# Patient Record
Sex: FEMALE | Race: WHITE | NOT HISPANIC OR LATINO | Employment: FULL TIME | URBAN - METROPOLITAN AREA
[De-identification: names, ages, dates, MRNs, and addresses within clinical notes are randomized per-mention and may not be internally consistent; named-entity substitution may affect disease eponyms.]

---

## 2018-03-23 ENCOUNTER — TELEPHONE (OUTPATIENT)
Dept: NEUROLOGY | Facility: CLINIC | Age: 53
End: 2018-03-23

## 2019-03-23 ENCOUNTER — OFFICE VISIT (OUTPATIENT)
Dept: URGENT CARE | Facility: URGENT CARE | Age: 54
End: 2019-03-23
Payer: COMMERCIAL

## 2019-03-23 VITALS
TEMPERATURE: 97.4 F | HEART RATE: 91 BPM | DIASTOLIC BLOOD PRESSURE: 85 MMHG | RESPIRATION RATE: 18 BRPM | SYSTOLIC BLOOD PRESSURE: 119 MMHG | WEIGHT: 143 LBS | HEIGHT: 64 IN | OXYGEN SATURATION: 99 % | BODY MASS INDEX: 24.41 KG/M2

## 2019-03-23 DIAGNOSIS — B96.89 ACUTE BACTERIAL SINUSITIS: Primary | ICD-10-CM

## 2019-03-23 DIAGNOSIS — J01.90 ACUTE BACTERIAL SINUSITIS: Primary | ICD-10-CM

## 2019-03-23 PROCEDURE — 99202 OFFICE O/P NEW SF 15 MIN: CPT | Performed by: NURSE PRACTITIONER

## 2019-03-23 RX ORDER — AMOXICILLIN AND CLAVULANATE POTASSIUM 875; 125 MG/1; MG/1
1 TABLET, FILM COATED ORAL 2 TIMES DAILY
Qty: 20 TABLET | Refills: 0 | Status: SHIPPED | OUTPATIENT
Start: 2019-03-23 | End: 2019-08-14 | Stop reason: ALTCHOICE

## 2019-03-23 ASSESSMENT — ENCOUNTER SYMPTOMS
DIZZINESS: 0
PALPITATIONS: 0
FACIAL ASYMMETRY: 0
NUMBNESS: 0
LIGHT-HEADEDNESS: 0
HEADACHES: 1
NAUSEA: 0
VOMITING: 0
SPEECH DIFFICULTY: 0
FEVER: 1
MYALGIAS: 0
WHEEZING: 0
ABDOMINAL PAIN: 0
SHORTNESS OF BREATH: 0
STRIDOR: 0
SINUS PAIN: 1
RHINORRHEA: 1
ARTHRALGIAS: 0
SINUS PRESSURE: 1
CHILLS: 1
WEAKNESS: 0
COUGH: 1
SORE THROAT: 0

## 2019-03-23 ASSESSMENT — PAIN SCALES - GENERAL: PAINLEVEL: 4

## 2019-03-23 NOTE — PROGRESS NOTES
"Subjective      HPI    Erika Davalos is a 53 y.o. female who presents for 3-week history of rhinorrhea, sinus pain, tenderness.  Over the last couple of days she has noticed increasing sinus pain pressure and tenderness.  In addition, has also been experiencing sinus headache and upper dental discomfort.  She does report some fullness to her ears, no ear pain.  Denies sore throat.  Does report some postnasal drip.  Denies any chest congestion, tightness, wheezing.  She does report some intermittent low-grade fever and chills.  She does report past history of sinus surgery but this has been a few years ago.      Review of Systems   Constitutional: Positive for chills and fever.   HENT: Positive for ear pain (fullness), postnasal drip, rhinorrhea, sinus pressure and sinus pain. Negative for sore throat.    Respiratory: Positive for cough. Negative for shortness of breath, wheezing and stridor.    Cardiovascular: Negative for chest pain and palpitations.   Gastrointestinal: Negative for abdominal pain, nausea and vomiting.   Musculoskeletal: Negative for arthralgias and myalgias.   Neurological: Positive for headaches. Negative for dizziness, syncope, facial asymmetry, speech difficulty, weakness, light-headedness and numbness.       As noted in HPI.      Objective   /85 (BP Location: Left arm, Patient Position: Sitting, Cuff Size: Reg)   Pulse 91   Temp 36.3 °C (97.4 °F) (Temporal)   Resp 18   Ht 1.626 m (5' 4\")   Wt 64.9 kg (143 lb)   SpO2 99%   BMI 24.55 kg/m²     Physical Exam   Constitutional: She is oriented to person, place, and time. She appears well-developed and well-nourished. No distress.   HENT:   Head: Normocephalic.   Bilateral TMs intact with normal light reflex and with minimal erythema.  Posterior oropharynx with mild erythema with moderate amount of postnasal drip and cobblestone appearance.  Tonsils 1+ bilaterally without exudate.  Bilateral nares with erythema and edematous type " changes, some purulent discharge is present.  Nontender over frontal sinuses, mild palpable tenderness over bilateral maxillary sinuses.   Eyes: Pupils are equal, round, and reactive to light. Conjunctivae and EOM are normal.   Neck: Normal range of motion. Neck supple.   Cardiovascular: Normal rate, regular rhythm, normal heart sounds and intact distal pulses. Exam reveals no gallop and no friction rub.   No murmur heard.  Pulmonary/Chest: Effort normal and breath sounds normal. No stridor. No respiratory distress. She has no wheezes. She has no rales.   Abdominal: Soft. Bowel sounds are normal. There is no tenderness.   Lymphadenopathy:     She has no cervical adenopathy.   Neurological: She is alert and oriented to person, place, and time.   Skin: Skin is warm and dry. Capillary refill takes less than 2 seconds.   Psychiatric: She has a normal mood and affect.   Nursing note and vitals reviewed.      No results found for this or any previous visit (from the past 4 hour(s)).        Assessment/Plan   Diagnoses and all orders for this visit:    Acute bacterial sinusitis  -     amoxicillin-pot clavulanate (AUGMENTIN) 875-125 mg per tablet; Take 1 tablet by mouth 2 (two) times a day for 10 days        Discussion:   Given symptoms and physical exam, findings are consistent with acute bacterial sinusitis.  She will be treated with 10-day course of Augmentin, as above.  I did offer patient a prescription of Flonase, patient declines.  She may continue symptomatic cares including saline nasal rinse, hot steamy showers, Tylenol/ibuprofen as needed for fever/pain,  mucolytics, antitussives, and decongestants.  Expected course of this diagnosis discussed with pt. Pt will f/u in clinic prn persisting or worsening symptoms.  Pt verbalizes understanding and agrees with plan.       Hailey Zuluaga, CNP

## 2019-07-01 ENCOUNTER — OFFICE VISIT (OUTPATIENT)
Dept: URGENT CARE | Facility: URGENT CARE | Age: 54
End: 2019-07-01
Payer: COMMERCIAL

## 2019-07-01 VITALS
HEART RATE: 88 BPM | TEMPERATURE: 97.8 F | WEIGHT: 141 LBS | SYSTOLIC BLOOD PRESSURE: 128 MMHG | DIASTOLIC BLOOD PRESSURE: 78 MMHG | OXYGEN SATURATION: 99 % | BODY MASS INDEX: 24.07 KG/M2 | RESPIRATION RATE: 16 BRPM | HEIGHT: 64 IN

## 2019-07-01 DIAGNOSIS — J30.2 SEASONAL ALLERGIES: Primary | ICD-10-CM

## 2019-07-01 PROCEDURE — 99213 OFFICE O/P EST LOW 20 MIN: CPT | Performed by: NURSE PRACTITIONER

## 2019-07-01 ASSESSMENT — ENCOUNTER SYMPTOMS
SHORTNESS OF BREATH: 0
SORE THROAT: 0
VOMITING: 0
CHILLS: 0
WHEEZING: 0
NAUSEA: 0
RHINORRHEA: 1
HEADACHES: 0
SINUS PAIN: 0
SINUS PRESSURE: 0
COUGH: 0
ARTHRALGIAS: 0
MYALGIAS: 0
PALPITATIONS: 0
FEVER: 0
ABDOMINAL PAIN: 0

## 2019-07-01 ASSESSMENT — PAIN SCALES - GENERAL: PAINLEVEL: 0-NO PAIN

## 2019-07-01 NOTE — PROGRESS NOTES
"Subjective      HPI    Erika Davalos is a 54 y.o. female who presents for symptoms that have been present for the last 2 to 3 days.  She is reporting sinus headache, fullness to bilateral ears, states it feels like fluid is shifting.  Feels like she is talking in a barrel.  She denies any fevers or chills.  Reports throat feels a little itchy, scratchy.  No sore throat.  Denies cough, chest congestion, chest tightness, chest pressure or palpitations.  Denies dyspnea, shortness of breath.  Denies wheezing.  Denies swollen lymph nodes.    Patient states she is undergone 2 sinus surgeries in the past.  Last sinus infection was in March 2019.  She does report history of some mild seasonal allergies.  She has been taking Sudafed, Tylenol, Mucinex, ibuprofen over-the-counter as needed.    Review of Systems   Constitutional: Negative for chills and fever.   HENT: Positive for ear pain (fullness), postnasal drip and rhinorrhea. Negative for sinus pressure, sinus pain and sore throat.    Eyes: Negative for visual disturbance.   Respiratory: Negative for cough, shortness of breath and wheezing.    Cardiovascular: Negative for chest pain and palpitations.   Gastrointestinal: Negative for abdominal pain, nausea and vomiting.   Musculoskeletal: Negative for arthralgias and myalgias.   Neurological: Negative for headaches.       As noted in HPI.      Objective   /78   Pulse 88   Temp 36.6 °C (97.8 °F) (Temporal)   Resp 16   Ht 1.626 m (5' 4\")   Wt 64 kg (141 lb)   SpO2 99%   BMI 24.20 kg/m²     Physical Exam   Constitutional: She is oriented to person, place, and time. She appears well-developed and well-nourished. No distress.   HENT:   Head: Normocephalic.   Bilateral TMs intact with normal light reflex without erythema.  No effusion noted.  External auditory canals normal.  Bilateral nares with minimal erythema minimal edematous type changes.  Nontender over frontal maxillary sinuses.  Posterior oropharynx " with minimal erythema, very mild postnasal drainage and cobblestone appearance.  Tonsils not visualized.   Eyes: Pupils are equal, round, and reactive to light. Conjunctivae and EOM are normal.   Neck: Normal range of motion. Neck supple.   Cardiovascular: Normal rate, regular rhythm, normal heart sounds and intact distal pulses. Exam reveals no gallop and no friction rub.   No murmur heard.  Pulmonary/Chest: Effort normal and breath sounds normal. No stridor. No respiratory distress. She has no wheezes. She has no rales.   Abdominal: Soft. Bowel sounds are normal. There is no tenderness.   Lymphadenopathy:     She has no cervical adenopathy.   Neurological: She is alert and oriented to person, place, and time.   Skin: Skin is warm and dry. Capillary refill takes less than 2 seconds.   Psychiatric: She has a normal mood and affect.   Nursing note and vitals reviewed.      No results found for this or any previous visit (from the past 4 hour(s)).        Assessment/Plan   Diagnoses and all orders for this visit:    Seasonal allergies        Discussion:   Given symptoms and physical exam, there is no need for antibiotics at this time.  I discussed with patient that findings are consistent with seasonal allergies.  At this time I discussed with patient utilizing Allegra-D, Claritin-D or Zyrtec-D over-the-counter daily.)  Close window at night. Expected course of this diagnosis discussed with pt. Pt will f/u in clinic prn persisting or worsening symptoms.  Pt verbalizes understanding and agrees with plan.       Hailey Zuluaga, CNP

## 2019-08-14 ENCOUNTER — ANCILLARY PROCEDURE (OUTPATIENT)
Dept: ULTRASOUND IMAGING | Facility: CLINIC | Age: 54
End: 2019-08-14
Payer: COMMERCIAL

## 2019-08-14 ENCOUNTER — OFFICE VISIT (OUTPATIENT)
Dept: URGENT CARE | Facility: URGENT CARE | Age: 54
End: 2019-08-14
Payer: COMMERCIAL

## 2019-08-14 VITALS
RESPIRATION RATE: 16 BRPM | BODY MASS INDEX: 25.78 KG/M2 | WEIGHT: 151 LBS | OXYGEN SATURATION: 96 % | HEART RATE: 113 BPM | TEMPERATURE: 99 F | SYSTOLIC BLOOD PRESSURE: 111 MMHG | DIASTOLIC BLOOD PRESSURE: 86 MMHG | HEIGHT: 64 IN

## 2019-08-14 DIAGNOSIS — M79.661 PAIN IN RIGHT LOWER LEG: Primary | ICD-10-CM

## 2019-08-14 PROBLEM — G47.33 OBSTRUCTIVE SLEEP APNEA SYNDROME: Status: ACTIVE | Noted: 2017-07-31

## 2019-08-14 PROCEDURE — 93971 EXTREMITY STUDY: CPT | Mod: TC,RT | Performed by: INTERNAL MEDICINE

## 2019-08-14 PROCEDURE — 99213 OFFICE O/P EST LOW 20 MIN: CPT | Performed by: PHYSICIAN ASSISTANT

## 2019-08-14 RX ORDER — OXYCODONE AND ACETAMINOPHEN 5; 325 MG/1; MG/1
TABLET ORAL
Refills: 0 | COMMUNITY
Start: 2019-08-06 | End: 2020-01-20 | Stop reason: ALTCHOICE

## 2019-08-14 RX ORDER — CEPHALEXIN 500 MG/1
500 CAPSULE ORAL 4 TIMES DAILY
COMMUNITY
End: 2020-01-20 | Stop reason: ALTCHOICE

## 2019-08-14 ASSESSMENT — PAIN SCALES - GENERAL: PAINLEVEL: 2

## 2019-08-14 NOTE — PROGRESS NOTES
"Subjective      HPI    Erika Davalos is a 54 y.o. female who presents for pain to her right leg, ankle, and foot.  Symptoms started shortly after surgery.  She has surgery on bilateral knees, she had revisions of her total knees.  Since then she has been mostly bedridden and has been in knee immobilizers.       Review of Systems    As noted in HPI.      Objective   /86 (BP Location: Left arm, Patient Position: Sitting, Cuff Size: Reg)   Pulse 113   Temp 37.2 °C (99 °F) (Temporal)   Resp 16   Ht 1.626 m (5' 4\")   Wt 68.5 kg (151 lb)   SpO2 96%   BMI 25.92 kg/m²     Physical Exam   Constitutional: She is oriented to person, place, and time. She appears well-developed. No distress.   HENT:   Head: Normocephalic and atraumatic.   Eyes: Pupils are equal, round, and reactive to light. No scleral icterus.   Neck: Normal range of motion.   Pulmonary/Chest: Effort normal. No respiratory distress. Musculoskeletal: Normal range of motion.      Right ankle: She exhibits swelling and ecchymosis. Tenderness.     Neurological: She is alert and oriented to person, place, and time. No sensory deficit. She exhibits normal muscle tone.   Skin: Skin is warm and dry. Capillary refill takes less than 2 seconds. No bruising and no rash noted. She is not diaphoretic.   Psychiatric: Her behavior is normal. Judgment and thought content normal.   Nursing note and vitals reviewed.      No results found for this or any previous visit (from the past 4 hour(s)).    X-ray Chest 2 Views    Result Date: 7/16/2019  Exam: Chest x-ray, 2 views 07/16/2019 Clinical History:  PREOP Comparison(s): X-ray 7/28/2016 Findings: The lungs are clear. The heart size and pulmonary vascularity are within normal limits. There are no focal bony or soft tissue abnormalities.     IMPRESSION: Clear lungs.    X-ray Knee 1 Or 2 Views Left    Result Date: 8/6/2019  Exam: Bilateral knee 2 viewsfrom 08/06/2019 Clinical History:  status post revision of total " replacement of both knees Comparison/s: None Findings: There are bilateral total knee arthroplasties. No acute fracture or dislocation, evidence of infection or loosening. Symmetric appearance.     IMPRESSION: 1. Bilateral total knee arthroplasties.     X-ray Knee 1 Or 2 Views Right    Result Date: 8/6/2019  Exam: Bilateral knee 2 viewsfrom 08/06/2019 Clinical History:  status post revision of total replacement of both knees Comparison/s: None Findings: There are bilateral total knee arthroplasties. No acute fracture or dislocation, evidence of infection or loosening. Symmetric appearance.     IMPRESSION: 1. Bilateral total knee arthroplasties.    Us Venous Duplex Lower Extremity Right    Result Date: 8/14/2019  Exam: Ultrasound of the right lower extremity venous system from 08/14/2019 Clinical History: Pain in right lower leg; pain and swelling to the right lower leg Comparison(s): None Technique: Grayscale, color, and pulse Doppler ultrasound evaluation of the deep venous system of the right lower extremity was performed. Findings: There is normal grayscale and color Doppler signal and there is normal respiratory variation and calf augmentation within the right common femoral, femoral, popliteal, and upper calf veins. There are no occlusive or nonocclusive thrombi.     IMPRESSION: Negative for right lower extremity DVT.      Assessment/Plan   Diagnoses and all orders for this visit:    Pain in right lower leg  -     US Venous duplex lower extremity right        Discussion:   She does have tenderness to anterior lower leg and to the top of the foot with superficial lateral veins that the reports she never had her before she does report tingling to the little toe.  Given her recent surgery, being bedridden, and in knee immobilizers for a week, will order an ultrasound of right leg DVT.    Ultrasound results were discussed with the patient as above.  While the patient rest, ice, heat, Tylenol, and ibuprofen as  needed for symptoms.  There is some swelling to this extremity, she is showing elevate his foot as much as possible.  Discussed other possibilities for the pain including nerve pain following the surgery versus overuse of this leg as she is still in a knee immobilizer on the left leg.Expected course of this diagnosis discussed with pt. Pt will f/u in clinic prn persisting or worsening symptoms.  Pt verbalizes understanding and agrees with plan.      CRIS HUDSON

## 2019-08-23 ENCOUNTER — HOSPITAL ENCOUNTER (EMERGENCY)
Facility: HOSPITAL | Age: 54
Discharge: HOME | End: 2019-08-23
Attending: EMERGENCY MEDICINE
Payer: COMMERCIAL

## 2019-08-23 VITALS
HEIGHT: 64 IN | DIASTOLIC BLOOD PRESSURE: 90 MMHG | TEMPERATURE: 97.5 F | BODY MASS INDEX: 23.05 KG/M2 | OXYGEN SATURATION: 97 % | WEIGHT: 135 LBS | HEART RATE: 93 BPM | RESPIRATION RATE: 18 BRPM | SYSTOLIC BLOOD PRESSURE: 137 MMHG

## 2019-08-23 DIAGNOSIS — Z48.02 VISIT FOR SUTURE REMOVAL: Primary | ICD-10-CM

## 2019-08-23 PROCEDURE — 99281 EMR DPT VST MAYX REQ PHY/QHP: CPT | Performed by: EMERGENCY MEDICINE

## 2019-08-23 NOTE — ED PROVIDER NOTES
HPI:  Chief Complaint   Patient presents with   • Suture / Staple Removal     Pt presents to ED for eval of a possible suture left in her wound from her surgery in [place].        HPI patient had knee surgery in [place].  She started developing oozing from one small area of the wound.  Sutures had previously been taken out.  She spoke with her surgeon who thought it might be retained piece of suture material.    HISTORY:  History reviewed. No pertinent past medical history.    Past Surgical History:   Procedure Laterality Date   • COLON SURGERY     • ORTHOPEDIC SURGERY     • TOTAL KNEE ARTHROPLASTY      bilateral       Family History   Problem Relation Age of Onset   • Stomach/Intestinal Problems Mother    • Cancer Father        Social History     Tobacco Use   • Smoking status: Former Smoker   • Smokeless tobacco: Never Used   Substance Use Topics   • Alcohol use: Yes     Comment: seldom   • Drug use: Never         ROS:  Review of Systems no fever.  No chest or abdominal pain    PE:  ED Triage Vitals   Temp Pulse Resp BP SpO2   -- -- -- -- --      Temp src Heart Rate Source Patient Position BP Location FiO2 (%)   -- -- -- -- --       Physical Exam left anterior knee with well-healed incision except for one area with small amount of granulation tissue.  No evidence of infection.    ED LABS:  Labs Reviewed - No data to display      ED IMAGES:  No orders to display       ED PROCEDURES:  Procedures    ED COURSE:            MDM: Nurse will attempt to determine if there is piece of retained suture and remove it.  MDM    Final diagnoses:   [Z48.02] Visit for suture removal   Retained suture left knee incision.     A voice recognition program was used to aid in documentation of this record. Sometimes words are not printed exactly as they were spoken.  While efforts were made to carefully edit and correct any inaccuracies, some errors may be present; please take these into context.  Please contact the provider if  errors are identified.        Evelin Gurrola MD  08/23/19 [id number]

## 2019-09-03 ENCOUNTER — TELEPHONE (OUTPATIENT)
Dept: ADMINISTRATIVE | Facility: HOSPITAL | Age: 54
End: 2019-09-03

## 2019-09-03 NOTE — TELEPHONE ENCOUNTER
Will verify with Dr. Urbina, more than likely as she is not a current patient she will need to schedule an appointment to be seen by one of the providers in order for them to address the suture.

## 2019-09-03 NOTE — TELEPHONE ENCOUNTER
Per Dr. Urbina, she will need an appointment for him to address her knee.   Please schedule with Dr. Urbina as a new patient consult for her knee if she would like to be seen.

## 2019-09-04 NOTE — TELEPHONE ENCOUNTER
Patient said she needs this taken care of sooner rather than over 2 weeks away, thus she will see what her plan B may be, will call back if she still needs us. CHLOÉ

## 2020-01-20 ENCOUNTER — HOSPITAL ENCOUNTER (EMERGENCY)
Facility: HOSPITAL | Age: 55
Discharge: HOME | End: 2020-01-20
Payer: COMMERCIAL

## 2020-01-20 ENCOUNTER — OFFICE VISIT (OUTPATIENT)
Dept: URGENT CARE | Facility: URGENT CARE | Age: 55
End: 2020-01-20
Payer: COMMERCIAL

## 2020-01-20 VITALS
RESPIRATION RATE: 16 BRPM | DIASTOLIC BLOOD PRESSURE: 86 MMHG | OXYGEN SATURATION: 98 % | HEART RATE: 90 BPM | TEMPERATURE: 98.2 F | SYSTOLIC BLOOD PRESSURE: 168 MMHG

## 2020-01-20 VITALS
TEMPERATURE: 99.1 F | HEIGHT: 64 IN | DIASTOLIC BLOOD PRESSURE: 86 MMHG | SYSTOLIC BLOOD PRESSURE: 140 MMHG | WEIGHT: 136 LBS | HEART RATE: 96 BPM | OXYGEN SATURATION: 97 % | BODY MASS INDEX: 23.22 KG/M2

## 2020-01-20 DIAGNOSIS — R07.9 CHEST PAIN, UNSPECIFIED TYPE: Primary | ICD-10-CM

## 2020-01-20 DIAGNOSIS — M54.2 NECK PAIN ON RIGHT SIDE: ICD-10-CM

## 2020-01-20 DIAGNOSIS — M79.601 RIGHT ARM PAIN: ICD-10-CM

## 2020-01-20 PROCEDURE — 93000 ELECTROCARDIOGRAM COMPLETE: CPT | Mod: 77 | Performed by: FAMILY MEDICINE

## 2020-01-20 PROCEDURE — 99213 OFFICE O/P EST LOW 20 MIN: CPT | Mod: NCNR | Performed by: PHYSICIAN ASSISTANT

## 2020-01-20 PROCEDURE — 99281 EMR DPT VST MAYX REQ PHY/QHP: CPT

## 2020-01-20 PROCEDURE — 93005 ELECTROCARDIOGRAM TRACING: CPT

## 2020-01-20 RX ORDER — TRAMADOL HYDROCHLORIDE 50 MG/1
50 TABLET ORAL EVERY 6 HOURS PRN
COMMUNITY
End: 2022-12-06 | Stop reason: ALTCHOICE

## 2020-01-20 ASSESSMENT — PAIN DESCRIPTION - DESCRIPTORS: DESCRIPTORS: SHARP

## 2020-01-20 NOTE — PROGRESS NOTES
"Clinic Note     SUBJECTIVE    Chief Complaint:  Chief Complaint   Patient presents with   • Generalized Body Aches     Started this morning. Startes at neck radiates to (R) scapula then (R) arm. Arm goes numb and hurts. Then starts in her chest, she has had something like this before and was plureisy. Took ASA, antacids, IBU, tegament. Consist ache in chest and sme thightness present.       HPI:    Erika Davalos is a 54 y.o. female who presents for Generalized Body Aches (Started this morning. Startes at neck radiates to (R) scapula then (R) arm. Arm goes numb and hurts. Then starts in her chest, she has had something like this before and was plureisy. Took ASA, antacids, IBU, tagamet. Consistent ache in chest and some tightness present.)  .     The following have been reviewed and updated as appropriate in this visit:         No Known Allergies  Current Outpatient Medications   Medication Sig Dispense Refill   • traMADol (ULTRAM 50) 50 mg tablet Take 50 mg by mouth every 6 (six) hours as needed for pain scale 8-10/10     • IBUPROFEN ORAL Take by mouth       No current facility-administered medications for this visit.      Past Medical History:   Diagnosis Date   • Sleep apnea      Past Surgical History:   Procedure Laterality Date   • COLON SURGERY     • ORTHOPEDIC SURGERY     • TOTAL KNEE ARTHROPLASTY      bilateral     Family History   Problem Relation Age of Onset   • Stomach/Intestinal Problems Mother    • Cancer Father      Social History     Occupational History   • Not on file   Tobacco Use   • Smoking status: Former Smoker   • Smokeless tobacco: Never Used   Substance and Sexual Activity   • Alcohol use: Yes     Comment: seldom   • Drug use: Never   • Sexual activity: Defer   Social History Narrative   • Not on file       Review of Systems:  Review of Systems   As per HPI    OBJECTIVE     Vitals:  /86   Pulse 96   Temp 37.3 °C (99.1 °F) (Temporal)   Ht 1.626 m (5' 4.02\")   Wt 61.7 kg (136 lb)  "  SpO2 97%   BMI 23.33 kg/m²     PE:  Physical Exam  Vitals signs and nursing note reviewed.   Constitutional:       Appearance: Normal appearance. She is well-developed.   HENT:      Head: Normocephalic and atraumatic.   Eyes:      Pupils: Pupils are equal, round, and reactive to light.   Neck:      Musculoskeletal: Neck supple.   Cardiovascular:      Rate and Rhythm: Normal rate and regular rhythm.   Pulmonary:      Effort: Pulmonary effort is normal.      Breath sounds: Normal breath sounds.   Abdominal:      General: Bowel sounds are normal.      Palpations: Abdomen is soft.   Skin:     General: Skin is warm and dry.   Neurological:      Mental Status: She is alert and oriented to person, place, and time.   Psychiatric:         Behavior: Behavior normal.           No results found for this or any previous visit (from the past 12 hour(s)).      ASSESSMENT:    Erika was seen today for generalized body aches.    Diagnoses and all orders for this visit:    Chest pain, unspecified type  -     ECG 12 lead  -     ECG 12 lead    Right arm pain    Neck pain on right side          PLAN  Patient presents to urgent care complaining of right sided chest pain this morning that has since moved to the right side of her neck, right upper back, right arm.  She did go to the chiropractor with little relief of her pain.  She has tried aspirin, Tagamet, ibuprofen without relief.  EKG is normal, but due to her symptomology I did consult with Dr. Urbina who also felt patient needed higher level of care at the ED.  She has been to [facility] for cardiac problems, which sounds like possible pericarditis.    Transfer center notified and Dr. Valdovinos is the accepting physician at the ED.    CRIS Rogers

## 2020-07-03 ENCOUNTER — HOSPITAL ENCOUNTER (EMERGENCY)
Facility: HOSPITAL | Age: 55
Discharge: HOME | End: 2020-07-03
Attending: EMERGENCY MEDICINE
Payer: COMMERCIAL

## 2020-07-03 ENCOUNTER — APPOINTMENT (OUTPATIENT)
Dept: RADIOLOGY | Facility: HOSPITAL | Age: 55
End: 2020-07-03
Payer: COMMERCIAL

## 2020-07-03 VITALS
HEART RATE: 106 BPM | RESPIRATION RATE: 13 BRPM | HEIGHT: 64 IN | BODY MASS INDEX: 23.05 KG/M2 | WEIGHT: 135 LBS | SYSTOLIC BLOOD PRESSURE: 128 MMHG | TEMPERATURE: 98 F | OXYGEN SATURATION: 99 % | DIASTOLIC BLOOD PRESSURE: 88 MMHG

## 2020-07-03 DIAGNOSIS — S82.852A TRIMALLEOLAR FRACTURE OF LEFT ANKLE: Primary | ICD-10-CM

## 2020-07-03 PROCEDURE — 73610 X-RAY EXAM OF ANKLE: CPT | Mod: LT

## 2020-07-03 PROCEDURE — 99152 MOD SED SAME PHYS/QHP 5/>YRS: CPT

## 2020-07-03 PROCEDURE — 27818 TREATMENT OF ANKLE FRACTURE: CPT | Performed by: EMERGENCY MEDICINE

## 2020-07-03 PROCEDURE — 96375 TX/PRO/DX INJ NEW DRUG ADDON: CPT

## 2020-07-03 PROCEDURE — 96374 THER/PROPH/DIAG INJ IV PUSH: CPT

## 2020-07-03 PROCEDURE — 99284 EMERGENCY DEPT VISIT MOD MDM: CPT | Performed by: EMERGENCY MEDICINE

## 2020-07-03 PROCEDURE — 6360000200 HC RX 636 W HCPCS (ALT 250 FOR IP): Performed by: EMERGENCY MEDICINE

## 2020-07-03 PROCEDURE — 2580000300 HC RX 258: Performed by: EMERGENCY MEDICINE

## 2020-07-03 RX ORDER — HYDROCODONE BITARTRATE AND ACETAMINOPHEN 5; 325 MG/1; MG/1
1-2 TABLET ORAL EVERY 6 HOURS PRN
Qty: 20 TABLET | Refills: 0 | Status: SHIPPED | OUTPATIENT
Start: 2020-07-03 | End: 2022-12-06 | Stop reason: ALTCHOICE

## 2020-07-03 RX ORDER — MORPHINE SULFATE 4 MG/ML
4 INJECTION, SOLUTION INTRAMUSCULAR; INTRAVENOUS
Status: DISCONTINUED | OUTPATIENT
Start: 2020-07-03 | End: 2020-07-03 | Stop reason: HOSPADM

## 2020-07-03 RX ORDER — SODIUM CHLORIDE 9 MG/ML
1000 INJECTION, SOLUTION INTRAVENOUS ONCE
Status: COMPLETED | OUTPATIENT
Start: 2020-07-03 | End: 2020-07-03

## 2020-07-03 RX ORDER — PROPOFOL 10 MG/ML
INJECTION, EMULSION INTRAVENOUS
Status: DISCONTINUED
Start: 2020-07-03 | End: 2020-07-03 | Stop reason: HOSPADM

## 2020-07-03 RX ORDER — ONDANSETRON HYDROCHLORIDE 2 MG/ML
4 INJECTION, SOLUTION INTRAVENOUS ONCE
Status: COMPLETED | OUTPATIENT
Start: 2020-07-03 | End: 2020-07-03

## 2020-07-03 RX ORDER — PROPOFOL 10 MG/ML
INJECTION, EMULSION INTRAVENOUS CODE/TRAUMA/SEDATION MEDICATION
Status: COMPLETED | OUTPATIENT
Start: 2020-07-03 | End: 2020-07-03

## 2020-07-03 RX ADMIN — ONDANSETRON 4 MG: 2 INJECTION INTRAMUSCULAR; INTRAVENOUS at 15:34

## 2020-07-03 RX ADMIN — PROPOFOL 70 MG: 10 INJECTION, EMULSION INTRAVENOUS at 16:32

## 2020-07-03 RX ADMIN — PROPOFOL 20 MG: 10 INJECTION, EMULSION INTRAVENOUS at 16:40

## 2020-07-03 RX ADMIN — MORPHINE SULFATE 4 MG: 4 INJECTION, SOLUTION INTRAMUSCULAR; INTRAVENOUS at 15:35

## 2020-07-03 RX ADMIN — PROPOFOL 30 MG: 10 INJECTION, EMULSION INTRAVENOUS at 16:34

## 2020-07-03 RX ADMIN — SODIUM CHLORIDE 1000 ML: 9 INJECTION, SOLUTION INTRAVENOUS at 15:34

## 2020-07-03 RX ADMIN — PROPOFOL 50 MG: 10 INJECTION, EMULSION INTRAVENOUS at 16:36

## 2020-07-03 ASSESSMENT — PAIN DESCRIPTION - DESCRIPTORS: DESCRIPTORS: SHARP;STABBING

## 2020-07-03 NOTE — ED PROCEDURE NOTE
Procedure  Procedural Sedation    Date/Time: 7/3/2020 4:25 PM  Performed by: Tyson Culp MD  Authorized by: Arnoldo Urbina MD     Consent:     Consent obtained:  Written/electronic    Consent given by:  Patient    Anesthetic risks, benefits, and alternatives explained to the patient: Yes    Indications:     Procedure performed:  Dislocation reduction and fracture reduction    Procedure Performed by:  Different physician    Intended level of sedation:  General anesthesia  Pre-sedation assessment:     Time since last food or drink:  1100    ASA classification: class 2 - patient with mild systemic disease and emergent      Neck mobility:  Full    Mouth opening:  3 or more finger widths    Mallampati score:  I - soft palate, uvula, fauces, pillars visible    Pre-sedation assessments completed and reviewed: airway patency, cardiovascular function, hydration status, mental status, nausea/vomiting, pain level, respiratory function and temperature      History of difficult intubation: no      Pre-sedation assessment completed:  7/3/2020 4:16 PM  Immediate pre-procedure details:     Reassessment: Patient reassessed immediately prior to procedure      Reviewed: vital signs, relevant labs/tests and NPO status    Procedure details (see MAR for exact dosages):     Sedation start time:  7/3/2020 4:31 PM    Preoxygenation:  Nonrebreather mask and nasal cannula    Medications:  Propofol    Intra-procedure monitoring:  Blood pressure monitoring, continuous pulse oximetry, continuous capnometry and EKG    Intra-procedure events: none      Sedation end time:  7/3/2020 4:48 PM  Post-procedure details:     Post-sedation assessment completed:  7/3/2020 5:10 PM    Attendance: Constant attendance by certified staff until patient recovered      Recovery: Patient returned to pre-procedure baseline      Patient Participation:  Complete - patient participated    Level of Consciousness:  Awake    Pain Management:  Adequate    Airway  Patency:  Patent    Anesthetic Complications: No      Cardiovascular Status:  Acceptable and stable    Respiratory Status:  Acceptable    Postoperative Hydration:  Acceptable    Patient is stable for discharge or admission: yes      Patient tolerance:  Tolerated well, no immediate complications                 Tyson Culp MD  07/03/20 8830

## 2020-07-03 NOTE — ED PROVIDER NOTES
HPI:  Chief Complaint   Patient presents with   • Fall     fell about 15 feet off a ladder. isolated injury to left ankle.   • Joint Swelling     HPI  55-year-old female was about 15 feet up on a ladder when she fell off the ladder and went through a bench.  She landed on her feet.  She has pain at the left ankle.  She reports some pain in the right ankle but quite minimal.  She denies fevers or chills or cough or any other recent illness.  Pain is much worse with any movement and better with stillness.  She does admit to hitting her head but did not have loss of consciousness.  With pain in the ankle she states she did almost pass out.  She has been out in the heat.    HISTORY:  Past Medical History:   Diagnosis Date   • Sleep apnea        Past Surgical History:   Procedure Laterality Date   • COLON SURGERY     • ORTHOPEDIC SURGERY     • TOTAL KNEE ARTHROPLASTY      bilateral       Family History   Problem Relation Age of Onset   • Stomach/Intestinal Problems Mother    • Cancer Father        Social History     Tobacco Use   • Smoking status: Former Smoker   • Smokeless tobacco: Never Used   Substance Use Topics   • Alcohol use: Yes     Comment: seldom   • Drug use: Never       ROS:  Constitutional: negative for fever  Cardiovascular: negative for chest pain  Respiratory: negative for cough  GI: negative for abdominal pain  Musculoskeletal: Left ankle pain  Neuro: negative for headache  Skin: negative for rash    PHYSICAL EXAM:  ED Triage Vitals [07/03/20 1517]   Temp Heart Rate Resp BP SpO2   36.7 °C (98.1 °F) 121 18 179/91 98 %      Temp Source Heart Rate Source Patient Position BP Location FiO2 (%)   Oral -- -- -- --     Nursing note and vitals reviewed.  Constitutional: appears well-developed.   Head: Normocephalic and atraumatic.   Neck: Supple.  Mild soft tissue tenderness.  Cardiovascular: Tachycardic  Pulmonary/Chest: No respiratory distress.  Nontender to palpation.  Abdominal: Soft and nontender.    Back:  Non-tender.  Musculoskeletal: Left ankle has edema diffusely but worse at lateral malleolus.  Pain with range of motion.  Slight posterior subluxation is apparent on gross examination.  Right ankle has minimal tenderness.  She can range this quite well without much pain.  Remainder of extremities nontender.  She has some soft tissue tenderness at base of neck.  Neurological: Alert. No focal deficits.  Skin: Skin is warm and dry. No rash noted.   Psychiatric: Normal mood and affect.    MDM: X-ray shows trimalleolar ankle fracture.  She feels better with nausea and pain medication.  Please see below for procedure note for fracture dislocation and subluxation reduction and splinting.  Please see Dr. Rose's documentation for propofol sedation procedure note.  Patient's case discussed with orthopedic surgeon who reviews films and agrees with follow-up in clinic in 3 to 4 days for planned ORIF next week.    X-ray ankle 3 or more views left   Final Result   IMPRESSION:   There is some improvement in alignment status post casting of known fractures      X-ray ankle 3 or more views left   Final Result   IMPRESSION:       1. Trimalleolar fracture with displacement and anterior widening of the ankle mortise.          ED Medication Administration from 07/03/2020 1514 to 07/03/2020 1746       Date/Time Order Dose Route Action Action by     07/03/2020 1534 sodium chloride 0.9 % bolus 1,000 mL 1,000 mL intravenous New Bag/New Syringe Beti, ROSELIA     07/03/2020 1609 sodium chloride 0.9 % bolus 1,000 mL 0 mL intravenous Stopped Beti A     07/03/2020 1534 ondansetron (ZOFRAN) injection 4 mg 4 mg intravenous Given Beti, A     07/03/2020 1535 morphine injection 4 mg 4 mg intravenous Given Beti, A     07/03/2020 1632 propofoL (DIPRIVAN) injection 70 mg intravenous Given AMANDA Culp     07/03/2020 1634 propofoL (DIPRIVAN) injection 30 mg intravenous Given Robbi D     07/03/2020 1636 propofoL (DIPRIVAN) injection 50 mg  intravenous Given AMANDA Culp     07/03/2020 1640 propofoL (DIPRIVAN) injection 20 mg intravenous Given AMANDA Culp          PROCEDURES:  Fracture - Orthopedic Injury Treatment    Date/Time: 7/7/2020 12:15 AM  Performed by: Arnoldo Urbina MD  Authorized by: Arnoldo Urbina MD     Consent:     Consent obtained:  Written    Consent given by:  Patient  Injury:     Injury location:  Ankle    Ankle injury location:  L ankle    Ankle fracture type: trimalleolar    Sedation:     Sedation type:  Deep  Procedure details:     Manipulation performed: yes (Sellick maneuver and flexion at ankle to 90 degrees and anterior traction and ankle.)      Immobilization:  Splint    Splint type:  Ankle stirrup and short leg    Supplies used:  Ortho-Glass and cotton padding  Post-procedure assessment:     Neurological function: normal      Distal perfusion: normal      Range of motion: unchanged      Patient tolerance of procedure:  Tolerated well, no immediate complications          CLINICAL IMPRESSION:  Final diagnoses:   [S83.398F] Trimalleolar fracture of left ankle            A voice recognition program was used to aid in documentation of this record.  Sometimes words are not printed exactly as they were spoken.  While efforts were made to carefully edit and correct any inaccuracies, some areas may be present; please take these into context.  Please contact the provider if areas are identified.       Arnoldo Urbina MD  07/07/20 0018

## 2020-07-03 NOTE — DISCHARGE INSTRUCTIONS
Call [facility] orthopedic on Monday to arrange close follow-up appointment for ankle fracture.    Elevate your foot when seated.  Apply ice pack for 20 minutes every 3-4 hours.  Keep splint dry.      Tylenol (acetaminophen) 650 mg every 6 hours as needed for pain or fever.  Ibuprofen 600 mg every 6 hours as needed for pain or fever.  Take food with ibuprofen to protect your stomach.    Norco 1-2 every 4 hours as needed for worse pain.  Please use caution with this pain medicine. It is sedating. Do not drive, climb ladders, operate machinery, etc. while taking this medicine.  Please do not take Norco with plain Tylenol as there is already Tylenol in the Norco.

## 2021-04-16 ENCOUNTER — OFFICE VISIT NO LOS (OUTPATIENT)
Dept: INTERNAL MEDICINE | Facility: CLINIC | Age: 56
End: 2021-04-16
Payer: COMMERCIAL

## 2021-04-16 VITALS — WEIGHT: 129 LBS | BODY MASS INDEX: 22.02 KG/M2 | HEIGHT: 64 IN

## 2021-04-16 DIAGNOSIS — R73.03 PRE-DIABETES: ICD-10-CM

## 2021-04-16 PROCEDURE — 97802 MEDICAL NUTRITION INDIV IN: CPT | Performed by: DIETITIAN, REGISTERED

## 2021-04-16 NOTE — PROGRESS NOTES
"Nutrition Counseling prediabetes    Dx- prediabetes  Evidence by A 1 C 5.8  Related to -unknown    Nutrition prescription- 1200 kcal, 60 gm protein, 60-90 oz water, low carb diet     Individual  Beginning Time: 9:30     End Time: 10:15  56 y.o.    female  Others Present: unaccompanied  Referring Provider:  Sharda Red CNP    Vitals  Ht: 5'4\"  Ht Readings from Last 1 Encounters:   07/03/20 1.626 m (5' 4\")      Wt: 129#   Wt Readings from Last 1 Encounters:   07/03/20 61.2 kg (135 lb)      BMI: 22.14  BMI Readings from Last 1 Encounters:   07/03/20 23.17 kg/m²      BP:   BP Readings from Last 1 Encounters:   07/03/20 128/88     Usual weight 135 to 140s ( started watching carbs and lost weight )   Goal weight 125 and 130#       Food recall:   Water-100 oz, hot tea   B- cheese , nuts( while driving), on weekends- eggs, potatoes   L- cucumbers, celery, walnuts, cheese, beef stick or 1/2 peanut butter sandwich  ( no sugar added peanut butter)   S- steak , salad or spaghetti     X ray tech   Physical Activity:  Yes  Type: housecleaning and walking  Frequency: intermittently  Duration: NA    Topic's Discussed:  Good nutrition basics  Portion control  Sample menu  Physical activity  Label reading  Regular meals  Limit sugar  Preventing diabetes  Healthy snacks  Limit beverage calories  Healthy plate way of eating  Meal planning    Other Discussion:She has cyst on heart and goes to [place] for this and also attends [place] for  GI issues. Ulcerative colitis( 25 yr old, has been on  medication and perforation of bowel with extensive surgery and now has J pouch. ). Recently on antibiotics. She has diarrhea-long term issue . She recently started on probiotics VS3. She used to go quite frequently but now started on antibiotics much improved -was 7 to 10 x, now 5 at the most after starting antibiotic. She has cut most carbs-OB provider provided her with a gestation diabetic meal plan to her her started. Discussed whole wheat  carbs " when eating.  She has cut out  Pop- was drinking up to  2 quarts  Of Coke, now drinks more decaf tea and decaf coffee. She now drink sf pop with Stevia. She also quit alcohol . She has esophagitis . She is worried about Barretts esopoghitis. She has a high stress level.  Discussed how stress, exercise and food affects blood sugars. Discussed stress reducing activities.Reviewed FODMAP diet in detail and provided handout on how to reintroduce foods on a FODMAP diet   She doesn't tolerate oranges but tolerates tomatoes. On omprezole .Had bilateral knee replacements. She was dx with prediabetes- 5.8 on 2/21. She is thru menopause.  Handouts Provided:prediabetes, 1200 kcal meal plan, FODMAP grocery list and meal prep, how to reintroduce FODMAP foods       Patient Understanding:  good     Follow-up Plan  Registered Dietitian phone number provided    Patient Scheduled For Follow Up On: will call when she wants follow up   Goals  Insight Timer - work on stress level   1200 kcal meal plan   Time spent with patient: 45 minutes

## 2021-05-30 ENCOUNTER — RECORDS - HEALTHEAST (OUTPATIENT)
Dept: ADMINISTRATIVE | Facility: CLINIC | Age: 56
End: 2021-05-30

## 2021-10-15 ENCOUNTER — LAB REQUISITION (OUTPATIENT)
Dept: LAB | Facility: HOSPITAL | Age: 56
End: 2021-10-15
Payer: COMMERCIAL

## 2021-10-15 DIAGNOSIS — Z51.81 ENCOUNTER FOR THERAPEUTIC DRUG LEVEL MONITORING: ICD-10-CM

## 2021-10-15 PROCEDURE — 86481 TB AG RESPONSE T-CELL SUSP: CPT | Performed by: PHYSICIAN ASSISTANT

## 2021-10-18 LAB
M TB TUBERC IFN-G BLD QL: NEGATIVE
TBGOLD AG-NIL: -0.01 IU/ML

## 2022-01-03 NOTE — PROGRESS NOTES
Subjective -  New Dermatology Skin examination  Erika Davalos is a 56 y.o. female who presents for a lesion of concern . Lesions of concern include: hands, elbows, ear and eye.  Patient does not endorse a history of tanning bed use.  Patient skin history: Negative for personal history of non melanoma skin cancer, Negative for actinic keratosis, Negative personal history of melanoma,  Negative family history of melanoma.    Patient Concern: red dry skin  Location: ears, hands and elbows  Duration: 1 year  Symptoms: red, dry flaky and cracking skin  Treatments tried:  triamcinolone 0.1% cream and calciporiene cream  Associated: patient does also have ankylosing spondylitis      Patient Concern:Eyelid rash  Location: eyelids  Duration: 1 year  Symptoms:red, dry flaky and cracking skin, itchy  Treatments tried: bacitracin has been tried on her eyes. She has also tried polymyxin eye drops.   Response to treatments: helpful  Associated: patient does intermittently use eye makeup        Review of Systems  Review of Systems   Constitutional: Negative for fatigue and fever.   Skin: Negative for rash.   Allergic/Immunologic: Negative for environmental allergies and immunocompromised state.   Hematological: Does not bruise/bleed easily.   All other systems reviewed and are negative.        Past Medical History:   Diagnosis Date   • Sleep apnea           Past Surgical History:   Procedure Laterality Date   • COLON SURGERY     • ORTHOPEDIC SURGERY     • TOTAL KNEE ARTHROPLASTY      bilateral         family history includes Cancer in her father; Stomach/Intestinal Problems in her mother.      Medications  Current Outpatient Medications on File Prior to Visit   Medication Sig Dispense Refill   • calcium carbonate-vit D3-min 600 mg calcium- 400 unit tablet daily     • multivitamin with minerals tablet as directed     • guaiFENesin (Mucinex) 600 mg 12 hr tablet 1 tablet as needed     • omeprazole (PriLOSEC) 20 mg capsule Take 20  mg by mouth daily     • [DISCONTINUED] triamcinolone (KENALOG) 0.1 % cream every 12 hours     • HYDROcodone-acetaminophen (NORCO) 5-325 mg per tablet Take 1-2 tablets by mouth every 6 (six) hours as needed (pain) Max Daily Amount: 8 tablets (Patient not taking: Reported on 1/4/2022 ) 20 tablet 0   • traMADol (ULTRAM 50) 50 mg tablet Take 50 mg by mouth every 6 (six) hours as needed for pain scale 8-10/10     • IBUPROFEN ORAL Take by mouth       No current facility-administered medications on file prior to visit.       Allergies  Patient has no known allergies.      Physical Examination    Vital Signs There were no vitals taken for this visit.      Physical Exam Findings:   Constitutional: General Appearance: healthy-appearing, well-nourished, and well-developed. Level of Distress: NAD. Ambulation: ambulating normally.  Psychiatric: Insight: good judgement. Mental Status: normal mood and affect and active and alert. Orientation: to time, place, and person. Memory: recent memory normal and remote memory normal.  Head: normocephalic and atraumatic.  Eyes: Lids and Conjunctivae: no discharge or pallor and non-injected. Lens: clear. Sclerae: non-icteric.  Neurologic: Gait and Station: normal gait and station. Cranial Nerves: grossly intact. Coordination and Cerebellum: no tremor.    A focused exam of the hands elbows, eye lids and ear was performed.   erythematous plaques with flaky scale over dorsal hands, knuckles and palms and elbows.   - patient has erythematous patches with flaking scale over bilateral eyelids          Erika was seen today for skin problem.    Diagnoses and all orders for this visit:    Eyelid dermatitis  - -Discussed that findings today are suspicious for an allergic contact dermatitis to the eyelids. Discussed recommendations for patient to discontinue all topical products to affected area other than what is discussed here.  Discussed at length that often an allergic contact dermatitis is not to  a new product, but rather to an old product to which the patient has recently become sensitized. Discussed recommendations for topical treatment with corticosteroids.  Discussed recommendations for patient to continue topical steroid use until clear and then for an additional two days.  Discussed that once clear, I recommend that the patient reintroduce home topical products one at a time for one week at a time per product in order to be able to identify any possible topical triggers. Discussed that while we treat this area, I do not recommend any soap or cleansers over the area. If patient would like another product to use as a moisturizer between applications of topical steroids, can use vanicream or plain vaseline.   - New prescription given for hydrocortisone 2.5% cream to be used up to twice daily until clear        Psoriasis  -     Ambulatory referral to Dermatology  Psoriasis is a disease of inflammation of the skin causing the skin cells to divide too quickly and grow thick rash in certain areas. Inflammation is present in the body as well, and can lead to psoriatic arthritis, heart disease, high blood pressure, high cholesterol, diabetes, and metabolic syndrome.  It is important to maintain a healthy weight, decrease sugar intake (which drives inflammation), and try to reduce stress (with good sleep hygiene and regular exercise).  Topical steroid should be used appropriately matching strength with location to inflamed areas and thick areas twice daily as needed.  - Patient can continue to use calciopotriene. New prescription given for triamcinolone 0.5% topical to be used up to twice daily until clear. Patient has discussed use of Humira with rheumatology - discussed that I agree with rheumatology that this would also be helpful for the skin        Return in about 3 months (around 4/4/2022) for Psorasis Follow up.

## 2022-01-04 ENCOUNTER — OFFICE VISIT (OUTPATIENT)
Dept: DERMATOLOGY | Facility: CLINIC | Age: 57
End: 2022-01-04
Payer: COMMERCIAL

## 2022-01-04 DIAGNOSIS — L40.9 PSORIASIS: ICD-10-CM

## 2022-01-04 DIAGNOSIS — H01.119 EYELID DERMATITIS, ALLERGIC/CONTACT: Primary | ICD-10-CM

## 2022-01-04 PROCEDURE — 99203 OFFICE O/P NEW LOW 30 MIN: CPT | Performed by: STUDENT IN AN ORGANIZED HEALTH CARE EDUCATION/TRAINING PROGRAM

## 2022-01-04 RX ORDER — LYSINE HCL 500 MG
TABLET ORAL EVERY 24 HOURS
COMMUNITY

## 2022-01-04 RX ORDER — GUAIFENESIN 600 MG/1
TABLET, EXTENDED RELEASE ORAL
COMMUNITY

## 2022-01-04 RX ORDER — OMEPRAZOLE 20 MG/1
20 CAPSULE, DELAYED RELEASE ORAL DAILY
COMMUNITY
Start: 2022-01-03

## 2022-01-04 RX ORDER — TRIAMCINOLONE ACETONIDE 1 MG/G
CREAM TOPICAL EVERY 12 HOURS
COMMUNITY
End: 2022-01-04 | Stop reason: ALTCHOICE

## 2022-01-04 RX ORDER — TRIAMCINOLONE ACETONIDE 5 MG/G
CREAM TOPICAL 2 TIMES DAILY
Qty: 454 G | Refills: 1 | Status: SHIPPED | OUTPATIENT
Start: 2022-01-04 | End: 2022-04-04

## 2022-01-04 RX ORDER — HYDROCORTISONE 25 MG/G
CREAM TOPICAL 2 TIMES DAILY PRN
Qty: 80 G | Refills: 2 | Status: SHIPPED | OUTPATIENT
Start: 2022-01-04 | End: 2022-01-18

## 2022-01-04 ASSESSMENT — ENCOUNTER SYMPTOMS
BRUISES/BLEEDS EASILY: 0
FATIGUE: 0
FEVER: 0

## 2022-01-04 ASSESSMENT — PAIN SCALES - GENERAL: PAINLEVEL: 0-NO PAIN

## 2022-01-04 NOTE — PATIENT INSTRUCTIONS
Patient Education     Psoriasis  Psoriasis is a long-term (chronic) skin condition. It occurs because your immune system causes skin cells to form too quickly. As a result, too many skin cells grow and create raised, red patches (plaques) that often look silvery on your skin. Plaques may show up anywhere on your body. They can be any size or shape. Symptoms of this condition range from mild to very severe.  Psoriasis cannot be passed from one person to another (is not contagious). Sometimes, the symptoms go away and then come back again.  What are the causes?  The cause of psoriasis is not known, but certain factors can make the condition worse. These include:  · Damage or trauma to the skin, such as cuts, scrapes, sunburn, and dryness.  · Not enough exposure to sunlight.  · Certain medicines.  · Alcohol.  · Tobacco use.  · Stress.  · Infections caused by bacteria or viruses.  What increases the risk?  You are more likely to develop this condition if you:  · Have a family history of psoriasis.  · Are obese.  · Are 20-40 years old.  · Are taking certain medicines.  What are the signs or symptoms?  There are different types of psoriasis. You can have more than one type of psoriasis during your life. The types are:  · Plaque. This is the most common.  · Guttate. This is also called eruptive psoriasis.  · Inverse.  · Pustular.  · Erythrodermic.  · Sebopsoriasis.  · Psoriatic arthritis.  Each type of psoriasis has different symptoms.  · Plaque psoriasis symptoms include red, raised plaques with a silvery-white coating (scale). These plaques may be itchy. Your nails may be pitted and crumbly or fall off.  · Guttate psoriasis symptoms include small red spots that often show up on your trunk, arms, and legs. These spots may develop after you have been sick, especially with strep throat.  · Inverse psoriasis symptoms include plaques in your underarm area, under your breasts, or on your genitals, groin, or  buttocks.  · Pustular psoriasis symptoms include pus-filled bumps that are painful, red, and swollen on the palms of your hands or the soles of your feet. You also may feel exhausted, feverish, weak, or have no appetite.  · Erythrodermic psoriasis symptoms include bright red skin that may look burned. You may have a fast heartbeat and a body temperature that is too high or too low. You may be itchy or in pain.  · Sebopsoriasis symptoms include red plaques that have a greasy coating, and are often on your scalp, forehead, and face.  · Psoriatic arthritis causes swollen, painful joints along with scaly skin plaques.    How is this diagnosed?  This condition is diagnosed based on your symptoms, family history, and a physical exam.  · You may also be referred to a health care provider who specializes in skin diseases (dermatologist).  · Your health care provider may remove a tissue sample (biopsy) for testing.  How is this treated?  There is no cure for this condition, but treatment can help manage it. Goals of treatment include:  · Helping your skin heal.  · Reducing itching and inflammation.  · Slowing the growth of new skin cells.  · Helping your immune system respond better to your skin.  Treatment varies, depending on the severity of your condition. This condition may be treated by:  · Creams or ointments to help with symptoms.  · Ultraviolet ray exposure (light therapy or phototherapy). This may include natural sunlight or light therapy in a medical office.  · Medicines (systemic therapy). These medicines can help your body better manage skin cell turnover and inflammation. Medicines may be given in the form of pills or injections. They may be used along with light therapy or ointments. You may also get antibiotic medicines if you have an infection.  Follow these instructions at home:  Skin Care  · Moisturize your skin as needed. Only use moisturizers that have been approved by your health care provider.  · Apply  cool, wet cloths (cold compresses) to the affected areas.  · Do not use a hot tub or take hot showers. Take lukewarm showers and baths.  · Do not scratch your skin.  Lifestyle  · Do not use any products that contain nicotine or tobacco, such as cigarettes, e-cigarettes, and chewing tobacco. If you need help quitting, ask your health care provider.  · Use techniques for stress reduction, such as meditation or yoga.  · Maintain a healthy weight. Follow instructions from your health care provider for weight control. These may include dietary restrictions.  · Get safe exposure to the sun as told by your health care provider. This may include spending regular intervals of time outdoors in sunlight. Do not get sunburned.  · Consider joining a psoriasis support group.    Medicines  · Take or use over-the-counter and prescription medicines only as told by your health care provider.  · If you were prescribed an antibiotic medicine, take it as told by your health care provider. Do not stop using the antibiotic even if you start to feel better.  Alcohol use  · Limit how much you use:  ? 0-1 drink a day for women.  ? 0-2 drinks a day for men.  · Be aware of how much alcohol is in your drink. In the [place] one drink equals one 12 oz bottle of beer (355 mL), one 5 oz glass of wine (148 mL), or one 1½ oz glass of hard liquor (44 mL).  General instructions  · Keep a journal to help track what triggers an outbreak. Try to avoid any triggers.  · See a counselor if feelings of sadness, frustration, and hopelessness about your condition are interfering with your work and relationships.  · Keep all follow-up visits as told by your health care provider. This is important.  Contact a health care provider if:  · You have a fever.  · Your pain gets worse.  · You have increasing redness or warmth in the affected areas.  · You have new or worsening pain or stiffness in your joints.  · Your nails start to break easily or pull away from the nail  "bed.  · You feel depressed.  Summary  · Psoriasis is a long-term (chronic) skin condition. Patches (plaques) may show up anywhere on your body.  · There is no cure for this condition, but treatment can help manage it. Treatment varies, depending on the severity of your condition.  · Keep a journal to track what triggers an outbreak. Try to avoid any triggers.  · Take or use over-the-counter and prescription medicines only as told by your health care provider.  · Keep all follow-up visits as told by your health care provider. This is important.  This information is not intended to replace advice given to you by your health care provider. Make sure you discuss any questions you have with your health care provider.  Document Revised: 10/22/2019 Document Reviewed: 10/22/2019  [employer] Patient Education © 2021 [employer]     Avoid sunlight between the hours of 10 am and 2 pm, wear a broad spectrum, water resistant sunscreen with SPF of 30-50 year round. Reapply sunscreen every 2 hours and after exercising or swimming. Wearing a 6\" broad brimmed hat, a lip sunblock of SPF of 30-50, and sun protective clothing is also suggested year round.   Recommended sunscreens include Neutrogena Ultra Sheer Dry Touch SPF 50 or higher, Neutrogena Sheer Zinc Dry Touch SPF 50, and Vanicream Sport.  ---------------------------------------------------------------------  Monthly self-examination of your skin is important. Should any lesions, including moles, change in size, shape, color, itch, bleed or burn, contact our office for sooner evaluation.  ----------------------------------------------------------------------  **We are always looking for opportunities to improve your care and patient experience. You will be receiving a survey about your care via text or e-mail. If you come across questions in the survey that are not applicable to your visit, please leave these questions blank. Your satisfaction is important to us so please be " honest. Your identity is kept confidential. Thank you!**  Vanicream can be uses all over

## 2022-03-11 ENCOUNTER — ANCILLARY PROCEDURE (OUTPATIENT)
Dept: CARDIOLOGY | Facility: CLINIC | Age: 57
End: 2022-03-11
Payer: COMMERCIAL

## 2022-03-11 VITALS
BODY MASS INDEX: 22.02 KG/M2 | HEIGHT: 64 IN | OXYGEN SATURATION: 99 % | WEIGHT: 129 LBS | SYSTOLIC BLOOD PRESSURE: 126 MMHG | HEART RATE: 72 BPM | DIASTOLIC BLOOD PRESSURE: 82 MMHG

## 2022-03-11 VITALS — SYSTOLIC BLOOD PRESSURE: 110 MMHG | DIASTOLIC BLOOD PRESSURE: 71 MMHG

## 2022-03-11 VITALS — WEIGHT: 129 LBS | HEIGHT: 64 IN | BODY MASS INDEX: 22.02 KG/M2

## 2022-03-11 DIAGNOSIS — R07.9 CHEST PAIN: ICD-10-CM

## 2022-03-11 DIAGNOSIS — Q24.8 PERICARDIAL CYST: ICD-10-CM

## 2022-03-11 LAB
ASCENDING AORTA: 2.58 CM
AV LVOT PEAK GRADIENT: 2.6 MMHG
AV MEAN GRADIENT: 2.56 MMHG
AV PEAK GRADIENT: 4.93 MMHG
BSA FOR ECHO PROCEDURE: 1.63 M2
DOP CALC AO PEAK VEL: 1.11 M/S
DOP CALC AO VTI: 23.9 CM
DOP CALC LVOT DIAMETER: 2.04 CM
DOP CALC LVOT STROKE VOLUME: 58 CM3
DOP CALC MV VTI: 29.21 CM
DOP CALC RVOT PEAK VEL: 0.6 M/S
DOP CALCLVOT PEAK VEL VTI: 17.7 CM
E/A RATIO: 1.5
E/E' RATIO (AVERAGE): 9.7
E/E' RATIO: 10.3
ECHO EF ESTIMATED: 65 %
EJECTION FRACTION: 69 %
ERAP: 5 MMHG
INTERVENTRICULAR SEPTUM: 0.5 CM (ref 0.6–1.1)
IVC PROX: 1.44 CM
LA AREA A4C SYSTOLE: 38.8 CM3
LEFT ATRIUM SIZE: 2.23 CM
LEFT ATRIUM VOLUME INDEX: 21 ML/M2
LEFT ATRIUM VOLUME: 34 CM3
LEFT INTERNAL DIMENSION IN SYSTOLE: 2.5 CM (ref 2.1–4)
LEFT VENTRICLE DIASTOLIC VOLUME: 31 ML
LEFT VENTRICLE DIASTOLIC VOLUME: 67 CM3
LEFT VENTRICLE SYSTOLIC VOLUME: 21 CM3
LEFT VENTRICLE SYSTOLIC VOLUME: 5 ML
LEFT VENTRICULAR INTERNAL DIMENSION IN DIASTOLE: 3.6 CM (ref 3.5–6)
LVAD-AP2: 25 CM2
MV DEC SLOPE: 7280 MM/S2
MV DT: 169 MS
MV MEAN GRADIENT: 2.3 MMHG
MV PEAK A VEL: 78 CM/S
MV PEAK E VEL: 115 CM/S
MV PEAK GRADIENT: 8 MMHG
MV STENOSIS PRESSURE HALF TIME: 54 MS
MV VALVE AREA P 1/2 METHOD: 4.07 CM2
MV VMAX: 137 CM/S
MVA (VTI): 1.98 CM2
NUC STRESS EJECTION FRACTION: 84 %
NUC STRESS TID: 0.67
POSTERIOR WALL: 0.6 CM (ref 0.6–1.1)
PULM VEIN S/D RATIO: 0.8
PV PEAK D VEL: 50 CM/S
PV PEAK GRADIENT: 2.82 MMHG
PV PEAK S VEL: 40 CM/S
PV VMAX: 0.84 M/S
RA AREA: 9.7 CM2
RH CV BASELINE VITALS COMMENTS: NORMAL
RH CV ECHO AV VALVE AREA VEL: 2.4 CM2
RH CV ECHO AV VALVE AREA VTI: 2.4 CM2
RH CV NM REST DOSE: 7.5
RH CV NM STRESS DOSE: 22.5
RH CV PEAK STRESS VITALS COMMENTS: NORMAL
RH CV RATE/PRESSURE PRODUCT: NORMAL
RH LVOT PEAK VELOCITY REST: 0.8 M/S
RIGHT VENTRICULAR INTERNAL DIMENSION IN DIASTOLE: 2.8 CM
RV AP4 BASE: 2.6 CM
S': 13.4 CM/S
STRESS BASELINE BP: NORMAL MMHG
STRESS BASELINE HR: 79 BPM
STRESS O2 SAT REST: 98 %
STRESS PERCENT HR: 101 %
STRESS POST ESTIMATED WORKLOAD: 12.1 METS
STRESS POST EXERCISE DUR MIN: 10 MIN
STRESS POST EXERCISE DUR SEC: 30 SEC
STRESS POST O2 SAT PEAK: 98 %
STRESS POST PEAK BP: NORMAL MMHG
STRESS POST PEAK HR: 166 BPM
STRESS TARGET HR: 140 BPM
TDI: 11.2 CM/S
TDILATERAL: 12.8 CM/S
TRICUSPID ANNULAR PLANE SYSTOLIC EXCURSION: 2.7 CM

## 2022-03-11 PROCEDURE — A9502 TC99M TETROFOSMIN: HCPCS | Performed by: INTERNAL MEDICINE

## 2022-03-11 PROCEDURE — 78452 HT MUSCLE IMAGE SPECT MULT: CPT | Performed by: INTERNAL MEDICINE

## 2022-03-11 PROCEDURE — 93306 TTE W/DOPPLER COMPLETE: CPT | Performed by: INTERNAL MEDICINE

## 2022-03-11 PROCEDURE — 93015 CV STRESS TEST SUPVJ I&R: CPT | Performed by: INTERNAL MEDICINE

## 2022-04-05 RX ORDER — BETA-CAROTENE(A)-C,E/SELENIUM
CAPSULE ORAL
COMMUNITY

## 2022-04-05 RX ORDER — CHOLECALCIFEROL (VITAMIN D3) 50 MCG
50 TABLET ORAL DAILY
COMMUNITY

## 2022-04-05 RX ORDER — GINSENG 100 MG
CAPSULE ORAL
COMMUNITY

## 2022-04-06 ENCOUNTER — OFFICE VISIT (OUTPATIENT)
Dept: CARDIOLOGY | Facility: CLINIC | Age: 57
End: 2022-04-06
Payer: COMMERCIAL

## 2022-04-06 VITALS
BODY MASS INDEX: 21.34 KG/M2 | HEIGHT: 64 IN | DIASTOLIC BLOOD PRESSURE: 68 MMHG | HEART RATE: 71 BPM | RESPIRATION RATE: 16 BRPM | WEIGHT: 125 LBS | SYSTOLIC BLOOD PRESSURE: 118 MMHG

## 2022-04-06 DIAGNOSIS — R07.9 CHEST PAIN, UNSPECIFIED TYPE: ICD-10-CM

## 2022-04-06 DIAGNOSIS — G47.33 OBSTRUCTIVE SLEEP APNEA SYNDROME: Primary | ICD-10-CM

## 2022-04-06 DIAGNOSIS — R06.09 DYSPNEA ON EXERTION: ICD-10-CM

## 2022-04-06 PROCEDURE — 99202 OFFICE O/P NEW SF 15 MIN: CPT | Performed by: INTERNAL MEDICINE

## 2022-04-06 ASSESSMENT — ENCOUNTER SYMPTOMS
LIGHT-HEADEDNESS: 1
CLAUDICATION: 0
BLACKOUTS: 0
NEAR-SYNCOPE: 0
LOSS OF BALANCE: 0
SNORING: 1
BRUISES/BLEEDS EASILY: 0
DIZZINESS: 1
FOCAL WEAKNESS: 0
SHORTNESS OF BREATH: 0
NUMBNESS: 1
SYNCOPE: 0
ORTHOPNEA: 0
SEIZURES: 0
PALPITATIONS: 1
HEADACHES: 1
WEAKNESS: 0
IRREGULAR HEARTBEAT: 1
ODYNOPHAGIA: 0
PND: 0
SENSORY CHANGE: 0
TREMORS: 0

## 2022-04-06 ASSESSMENT — PAIN SCALES - GENERAL: PAINLEVEL: 0-NO PAIN

## 2022-04-06 NOTE — PROGRESS NOTES
[address] [place] [address]                                         Cardiology Outpatient Consult Note    Subjective    Patient ID: Erika Davalos is a 57 y.o. female.    Chief Complaint:   Chief Complaint   Patient presents with   • Chest Pain   • Shortness of Breath       HPI  The patient is a pleasant 56-year-old female who presents with her mother today for evaluation of chest pain and shortness of breath.  The patient has longstanding history of ulcerative colitis and prolonged treatment with steroids in the past.  She had undergone a total colectomy and now with jejunostomy pouch.  She is also had avascular necrosis of her knees and had them replaced and has a minimal amount of lower extremity edema.  There is question as to whether or not the patient has ankylosing spondylitis.  She is positive for the HLA-B27 gene.    There is a question of a pericardial cyst that sometime in the past.  She is can get a CAT scan of her chest and lungs today so we will see if there is anything there.  She wonders if maybe it might not of been a complication related to a pneumothorax and chest tube placement should she had done some years ago.  On the recent CAT scan from 2018 I did not see any evidence to suggest pericardial cyst although it was not complete.    Her chest pain is atypical.  There is a sharp component to it.  This is usually comes totally out of the blue.  There is no exertional component.  She exercises most days a week.  She is not overweight.  Her overall exercise tolerance is fairly good but she feels with her recent surgeries as well as just not the ability to keep exercising as much as she would like to the test probably the component behind her shortness of breath which is mostly just being out of shape.  She had a recent nuclear stress test.  There is some ST segment changes with adenosine but that is nonspecific.  The images were all and totally within normal limits.    Her  echocardiogram shows mild mitral valve thickening.  They question mild mitral stenosis.  I overlooked the echocardiogram and she has normal wide opening of her mitral valve.  There was no evidence to suggest mitral stenosis.  The patient also had numerous CAT scans of the abdomen in the past.  This did not demonstrate any significant aortic calcification renal artery stenosis or other vascular disease in the abdomen.  There is no history suggestive of claudication.    Cardiovascular risk factors:  The patient is maternal grandfather had a pacemaker and defibrillator.  Brother had rheumatic fever.  No history of rheumatic fever for the patient.  She states that she is prediabetic with a hemoglobin A1c of 5.8%.  There is no history of hypertension there is minimal history of smoking her lipid panel is entirely within normal limits.    Specialty Problems    None         Past Medical History:   Diagnosis Date   • Anxiety    • GERD (gastroesophageal reflux disease)    • Osteopenia    • Psoriasis    • Sleep apnea    • Ulcerative colitis (CMS/HCC) (HCC)        Past Surgical History:   Procedure Laterality Date   • CARPAL TUNNEL RELEASE Bilateral 2009   • COLON SURGERY  11/1990    Colectomy with ileorectal anastomosis   • INGUINAL HERNIA REPAIR  10/1990   • ORIF ANKLE FRACTURE  07/2020   • ORTHOPEDIC SURGERY     • TONSILLECTOMY      Age 7   • TOTAL KNEE ARTHROPLASTY      bilateral       Allergies as of 04/06/2022   • (No Known Allergies)       Current Outpatient Medications   Medication Sig Dispense Refill   • grape seed extract 50 mg capsule Take by mouth     • vitamins A,C,and E-selenium (Antioxidant A/C/E/Selenium) capsule as directed     • cholecalciferol, vitamin D3, 50 mcg (2,000 unit) tablet Take 50 mcg by mouth daily     • calcium carbonate-vit D3-min 600 mg calcium- 400 unit tablet daily     • multivitamin with minerals tablet as directed     • guaiFENesin (MUCINEX) 600 mg 12 hr tablet 1 tablet as needed     •  omeprazole (PriLOSEC) 20 mg capsule Take 20 mg by mouth daily     • IBUPROFEN ORAL Take by mouth     • hydrocortisone 2.5 % cream Apply topically 2 (two) times a day as needed (Rash) for up to 14 days To the eyelids and ear 80 g 2   • triamcinolone (KENALOG) 0.5 % cream Apply topically 2 (two) times a day To the hands and elbows 454 g 1   • HYDROcodone-acetaminophen (NORCO) 5-325 mg per tablet Take 1-2 tablets by mouth every 6 (six) hours as needed (pain) Max Daily Amount: 8 tablets (Patient not taking: No sig reported) 20 tablet 0   • traMADol (ULTRAM 50) 50 mg tablet Take 50 mg by mouth every 6 (six) hours as needed for pain scale 8-10/10       No current facility-administered medications for this visit.       Family History   Problem Relation Age of Onset   • Stomach/Intestinal Problems Mother    • Cancer Father    • Hypertension Maternal Grandmother    • Heart disease Maternal Grandmother    • Stroke Paternal Grandmother    • Dementia Paternal Grandmother        Social History     Tobacco Use   • Smoking status: Former Smoker   • Smokeless tobacco: Never Used   Substance Use Topics   • Alcohol use: Yes     Comment: seldom   • Drug use: Never       Review of Systems  Review of Systems   HENT: Negative for odynophagia.    Cardiovascular: Positive for chest pain, dyspnea on exertion, irregular heartbeat, leg swelling/pain and palpitations. Negative for claudication, cyanosis, near-syncope, orthopnea, PND and syncope/fainting.   Respiratory: Positive for snoring and sleep apnea. Negative for shortness of breath.    Hematologic/Lymphatic: Does not bruise/bleed easily.   Neurological: Positive for dizziness, headaches, light-headedness and numbness. Negative for focal weakness, loss of balance, seizures, sensory change, tremors, weakness and blackouts.       Objective     Vitals:    04/06/22 0949   BP: 118/68   Pulse: 71   Resp: 16     Weight: 56.7 kg (125 lb)  Physical Exam  Constitutional:       Appearance: She is  well-developed.   HENT:      Head: Normocephalic.   Neck:      Vascular: Normal carotid pulses. No carotid bruit, hepatojugular reflux or JVD.   Cardiovascular:      Rate and Rhythm: Normal rate and regular rhythm.      Pulses:           Carotid pulses are 2+ on the right side and 2+ on the left side.       Radial pulses are 2+ on the right side and 2+ on the left side.        Popliteal pulses are 2+ on the right side and 2+ on the left side.      Heart sounds: Normal heart sounds.   Pulmonary:      Effort: No accessory muscle usage or respiratory distress.      Breath sounds: No decreased breath sounds, wheezing, rhonchi or rales.   Neurological:      Mental Status: She is alert and oriented to person, place, and time.   Psychiatric:         Speech: Speech normal.         Behavior: Behavior normal.         Thought Content: Thought content normal.         Judgment: Judgment normal.         Data Review:   Sodium   Date Value Ref Range Status   07/28/2016 140 135 - 145 mmol/L      Potassium   Date Value Ref Range Status   07/28/2016 3.5 3.5 - 5.1 mmol/L      Chloride   Date Value Ref Range Status   07/28/2016 104 98 - 107 mmol/L      CO2   Date Value Ref Range Status   07/28/2016 25 21 - 32 mmol/L      BUN   Date Value Ref Range Status   07/28/2016 16 7 - 25 mg/dL      Creatinine   Date Value Ref Range Status   07/28/2016 0.8 0.6 - 1.1 mg/dL      Glucose   Date Value Ref Range Status   07/28/2016 94 70 - 105 mg/dL      Calcium   Date Value Ref Range Status   07/28/2016 9.9 8.6 - 10.3 mg/dL            Assessment/Plan   Problem List Items Addressed This Visit        Cardiac and Vasculature    Chest pain  Her chest pain is atypical.  There does not appear to be any exertional component.  Her stress test was normal.  Her echocardiogram is essentially normal.  Review of the echocardiogram there is no evidence of mitral valve stenosis.  I think overall she is doing great from the cardiovascular standpoint.  I would  recommend no significant changes in her medications.  The patient would stay on the same medications indefinitely.  I do not feel any other additional testing is warranted at this time.  We will follow up with the patient as needed.  She will continue her routine follow-up with her primary care provider.       Sleep    Obstructive sleep apnea syndrome - Primary      Other Visit Diagnoses     Dyspnea on exertion                Electronically signed by: Nadira Dang LPN  4/6/2022  9:51 AM

## 2022-12-06 ENCOUNTER — OFFICE VISIT (OUTPATIENT)
Dept: FAMILY MEDICINE | Facility: CLINIC | Age: 57
End: 2022-12-06
Payer: COMMERCIAL

## 2022-12-06 VITALS
SYSTOLIC BLOOD PRESSURE: 118 MMHG | DIASTOLIC BLOOD PRESSURE: 66 MMHG | BODY MASS INDEX: 21.11 KG/M2 | HEART RATE: 114 BPM | OXYGEN SATURATION: 96 % | RESPIRATION RATE: 18 BRPM | TEMPERATURE: 98.6 F | WEIGHT: 123 LBS

## 2022-12-06 DIAGNOSIS — J06.9 UPPER RESPIRATORY TRACT INFECTION, UNSPECIFIED TYPE: Primary | ICD-10-CM

## 2022-12-06 PROCEDURE — 99213 OFFICE O/P EST LOW 20 MIN: CPT | Performed by: NURSE PRACTITIONER

## 2022-12-06 ASSESSMENT — PAIN SCALES - GENERAL: PAINLEVEL: 6

## 2022-12-10 NOTE — PROGRESS NOTES
SUBJECTIVE:   Chief Complaint   Patient presents with    Sinusitis       Erika Davalos is a 57 y.o. old female who presents for follow up of the following problems:      History provided by:  Patient  Sinusitis  Pain details:     Location:  Frontal and maxillary    Quality:  Aching, burning and pressure    Severity:  Mild    Duration:  2 days    Timing:  Constant  Duration:  5 days  Progression:  Waxing and waning  Chronicity:  New  Context: recent URI       Review of Systems    /66 (BP Location: Left arm, Patient Position: Sitting, Cuff Size: Regular Adult)   Pulse 114   Temp 37 °C (98.6 °F) (Temporal)   Resp 18   Wt 55.8 kg (123 lb)   SpO2 96%   BMI 21.11 kg/m²    OBJECTIVE:  She appears well, vital signs are as noted.   GENERAL: Patient in no acute distress and comfortable  HEENT: Normocephalic, atraumatic, EOMI, PERRLA, sclera anicteric.  TMs erythema and bulging.  Oral pharynx erythema with exudates nasal turbinates swollen with purulent drainage.  Cervical lymphadenopathy.  LUNGS: Clear to auscultation bilaterally both anterior and posterior  CARDIOVASCULAR: Regular rate and rhythm  ABDOMEN: Soft, positive bowel sounds.  No rebound or guarding.  EXTREMITIES: No clubbing, cyanosis or edema.    ASSESSMENT:   Diagnoses and all orders for this visit:    Upper respiratory tract infection, unspecified type         PLAN:  Symptomatic therapy suggested: push fluids, rest, gargle warm salt water, use vaporizer or mist prn, use acetaminophen, ibuprofen, antihistamine-decongestant of choice, cough suppressant of choice prn, apply heat to sinuses prn, and return office visit prn if symptoms persist or worsen. Lack of antibiotic effectiveness discussed with her. Call or return to clinic prn if these symptoms worsen or fail to improve as anticipated.